# Patient Record
Sex: FEMALE | Race: WHITE | NOT HISPANIC OR LATINO | Employment: UNEMPLOYED | URBAN - NONMETROPOLITAN AREA
[De-identification: names, ages, dates, MRNs, and addresses within clinical notes are randomized per-mention and may not be internally consistent; named-entity substitution may affect disease eponyms.]

---

## 2023-12-12 ENCOUNTER — ANCILLARY PROCEDURE (OUTPATIENT)
Dept: RADIOLOGY | Facility: CLINIC | Age: 43
End: 2023-12-12
Payer: COMMERCIAL

## 2023-12-12 ENCOUNTER — OFFICE VISIT (OUTPATIENT)
Dept: PRIMARY CARE | Facility: CLINIC | Age: 43
End: 2023-12-12
Payer: COMMERCIAL

## 2023-12-12 VITALS
OXYGEN SATURATION: 98 % | WEIGHT: 143 LBS | HEART RATE: 98 BPM | BODY MASS INDEX: 24.55 KG/M2 | DIASTOLIC BLOOD PRESSURE: 90 MMHG | SYSTOLIC BLOOD PRESSURE: 140 MMHG | TEMPERATURE: 97.6 F

## 2023-12-12 DIAGNOSIS — K59.01 SLOW TRANSIT CONSTIPATION: ICD-10-CM

## 2023-12-12 DIAGNOSIS — K59.01 SLOW TRANSIT CONSTIPATION: Primary | ICD-10-CM

## 2023-12-12 PROCEDURE — 1036F TOBACCO NON-USER: CPT

## 2023-12-12 PROCEDURE — 76856 US EXAM PELVIC COMPLETE: CPT

## 2023-12-12 PROCEDURE — 76700 US EXAM ABDOM COMPLETE: CPT | Performed by: RADIOLOGY

## 2023-12-12 PROCEDURE — 99214 OFFICE O/P EST MOD 30 MIN: CPT

## 2023-12-12 PROCEDURE — 76700 US EXAM ABDOM COMPLETE: CPT

## 2023-12-12 PROCEDURE — 76830 TRANSVAGINAL US NON-OB: CPT | Performed by: RADIOLOGY

## 2023-12-12 PROCEDURE — 76856 US EXAM PELVIC COMPLETE: CPT | Performed by: RADIOLOGY

## 2023-12-12 RX ORDER — POLYETHYLENE GLYCOL 3350 17 G/17G
17 POWDER, FOR SOLUTION ORAL DAILY
Qty: 30 PACKET | Refills: 3 | Status: SHIPPED | OUTPATIENT
Start: 2023-12-12 | End: 2024-04-10

## 2023-12-12 NOTE — PATIENT INSTRUCTIONS
US abdomen for adhesions  PRN miralax for bowel regulation    Pelvic floor PT  [facility]   [address] [place] [address] [phone]    [facility]  [address] [facility] [id number] [phone]    Thank you for coming in today, if any questions or concerns arise, please call my office.   Gilles Pardo, APRN-CNP

## 2023-12-12 NOTE — PROGRESS NOTES
Subjective   Patient ID: Neda Cox is a 42 y.o. female who presents for Constipation (Neda is here due to having harder stools then normal since breast feeding. Tried Colace for about a week and eating oatmeal and fiber, with minimal help. Did also try home remedies that helped but instantly went back to constipation. ).  Subjective  Neda Cox is a 42 y.o. female who presents for evaluation of constipation. Onset was 3 month ago. Patient has been having occasional loose and pellet-like stools per week. Defecation has been incomplete. Co-Morbid conditions:irritable bowel syndrome and recent childbirth, 4  sections. Symptoms have progressed to a point and plateaued. Current Health Habits: Eating fiber? yes - flax seed, Exercise? no, Adequate hydration? yes - plenty of water, juices. Current over the counter/prescription laxative: bulk (psyllium) and stimulant which has been somewhat effective.    Objective  [unfilled]     Assessment/Plan  Constipation.  Education about constipation causes and treatment discussed.  Follow up in  3 weeks if symptoms do not improve.  GI consult if no better and if US is negative          Vitals:    23 1424   BP: 140/90   Pulse: 98   Temp: 36.4 °C (97.6 °F)   SpO2: 98%       Review of Systems    Objective   Physical Exam    Assessment/Plan   Problem List Items Addressed This Visit    None  Visit Diagnoses       Slow transit constipation    -  Primary    Relevant Medications    polyethylene glycol (Glycolax, Miralax) 17 gram packet    Other Relevant Orders    US abdomen    US pelvis    Referral to Physical Therapy                 Thank you for coming in today, please call my office if you have any concerns or questions.     Gilles VALVERDE, CNP

## 2023-12-13 ENCOUNTER — TELEPHONE (OUTPATIENT)
Dept: PRIMARY CARE | Facility: CLINIC | Age: 43
End: 2023-12-13
Payer: COMMERCIAL

## 2023-12-13 NOTE — TELEPHONE ENCOUNTER
----- Message from ABRAM Trujillo-CNP sent at 12/13/2023  8:17 AM EST -----  Unremarkable US of abdomen

## 2023-12-19 ENCOUNTER — TELEPHONE (OUTPATIENT)
Dept: PRIMARY CARE | Facility: CLINIC | Age: 43
End: 2023-12-19
Payer: COMMERCIAL

## 2023-12-19 NOTE — TELEPHONE ENCOUNTER
----- Message from Gilles Pardo, ABRAM-CNP sent at 12/14/2023  3:52 PM EST -----  Small cyst right ovary, not worrisome, likely resolves without treatment, no sign of adhesion.

## 2024-01-29 ENCOUNTER — APPOINTMENT (OUTPATIENT)
Dept: OBSTETRICS AND GYNECOLOGY | Facility: CLINIC | Age: 44
End: 2024-01-29
Payer: COMMERCIAL

## 2024-02-26 ENCOUNTER — APPOINTMENT (OUTPATIENT)
Dept: OBSTETRICS AND GYNECOLOGY | Facility: CLINIC | Age: 44
End: 2024-02-26
Payer: COMMERCIAL

## 2024-11-01 ENCOUNTER — APPOINTMENT (OUTPATIENT)
Dept: PRIMARY CARE | Facility: CLINIC | Age: 44
End: 2024-11-01
Payer: COMMERCIAL

## 2024-11-02 ENCOUNTER — APPOINTMENT (OUTPATIENT)
Dept: RADIOLOGY | Facility: HOSPITAL | Age: 44
End: 2024-11-02
Payer: COMMERCIAL

## 2024-11-02 ENCOUNTER — HOSPITAL ENCOUNTER (EMERGENCY)
Facility: HOSPITAL | Age: 44
Discharge: HOME | End: 2024-11-03
Attending: EMERGENCY MEDICINE
Payer: COMMERCIAL

## 2024-11-02 DIAGNOSIS — M25.571 ACUTE RIGHT ANKLE PAIN: Primary | ICD-10-CM

## 2024-11-02 LAB
ALBUMIN SERPL BCP-MCNC: 4.2 G/DL (ref 3.4–5)
ALP SERPL-CCNC: 52 U/L (ref 33–110)
ALT SERPL W P-5'-P-CCNC: 10 U/L (ref 7–45)
ANION GAP SERPL CALC-SCNC: 9 MMOL/L (ref 10–20)
AST SERPL W P-5'-P-CCNC: 14 U/L (ref 9–39)
BASOPHILS # BLD AUTO: 0.03 X10*3/UL (ref 0–0.1)
BASOPHILS NFR BLD AUTO: 0.4 %
BILIRUB SERPL-MCNC: 0.3 MG/DL (ref 0–1.2)
BUN SERPL-MCNC: 16 MG/DL (ref 6–23)
CALCIUM SERPL-MCNC: 9.1 MG/DL (ref 8.6–10.3)
CHLORIDE SERPL-SCNC: 101 MMOL/L (ref 98–107)
CO2 SERPL-SCNC: 29 MMOL/L (ref 21–32)
CREAT SERPL-MCNC: 0.82 MG/DL (ref 0.5–1.05)
CRP SERPL-MCNC: 4.04 MG/DL
EGFRCR SERPLBLD CKD-EPI 2021: >90 ML/MIN/1.73M*2
EOSINOPHIL # BLD AUTO: 0.09 X10*3/UL (ref 0–0.7)
EOSINOPHIL NFR BLD AUTO: 1.1 %
ERYTHROCYTE [DISTWIDTH] IN BLOOD BY AUTOMATED COUNT: 13.2 % (ref 11.5–14.5)
ERYTHROCYTE [SEDIMENTATION RATE] IN BLOOD BY WESTERGREN METHOD: 19 MM/H (ref 0–20)
GLUCOSE SERPL-MCNC: 95 MG/DL (ref 74–99)
HCT VFR BLD AUTO: 36.3 % (ref 36–46)
HGB BLD-MCNC: 12.1 G/DL (ref 12–16)
IMM GRANULOCYTES # BLD AUTO: 0.02 X10*3/UL (ref 0–0.7)
IMM GRANULOCYTES NFR BLD AUTO: 0.3 % (ref 0–0.9)
LACTATE SERPL-SCNC: 0.5 MMOL/L (ref 0.4–2)
LYMPHOCYTES # BLD AUTO: 2.18 X10*3/UL (ref 1.2–4.8)
LYMPHOCYTES NFR BLD AUTO: 27.8 %
MCH RBC QN AUTO: 30.6 PG (ref 26–34)
MCHC RBC AUTO-ENTMCNC: 33.3 G/DL (ref 32–36)
MCV RBC AUTO: 92 FL (ref 80–100)
MONOCYTES # BLD AUTO: 0.55 X10*3/UL (ref 0.1–1)
MONOCYTES NFR BLD AUTO: 7 %
NEUTROPHILS # BLD AUTO: 4.96 X10*3/UL (ref 1.2–7.7)
NEUTROPHILS NFR BLD AUTO: 63.4 %
NRBC BLD-RTO: 0 /100 WBCS (ref 0–0)
PLATELET # BLD AUTO: 338 X10*3/UL (ref 150–450)
POTASSIUM SERPL-SCNC: 3.9 MMOL/L (ref 3.5–5.3)
PROT SERPL-MCNC: 8 G/DL (ref 6.4–8.2)
RBC # BLD AUTO: 3.95 X10*6/UL (ref 4–5.2)
SODIUM SERPL-SCNC: 135 MMOL/L (ref 136–145)
WBC # BLD AUTO: 7.8 X10*3/UL (ref 4.4–11.3)

## 2024-11-02 PROCEDURE — 85025 COMPLETE CBC W/AUTO DIFF WBC: CPT

## 2024-11-02 PROCEDURE — 85652 RBC SED RATE AUTOMATED: CPT | Performed by: EMERGENCY MEDICINE

## 2024-11-02 PROCEDURE — 99283 EMERGENCY DEPT VISIT LOW MDM: CPT | Mod: 25

## 2024-11-02 PROCEDURE — 73610 X-RAY EXAM OF ANKLE: CPT | Mod: RIGHT SIDE | Performed by: RADIOLOGY

## 2024-11-02 PROCEDURE — 86140 C-REACTIVE PROTEIN: CPT | Performed by: EMERGENCY MEDICINE

## 2024-11-02 PROCEDURE — 80053 COMPREHEN METABOLIC PANEL: CPT

## 2024-11-02 PROCEDURE — 36415 COLL VENOUS BLD VENIPUNCTURE: CPT

## 2024-11-02 PROCEDURE — 83605 ASSAY OF LACTIC ACID: CPT

## 2024-11-02 PROCEDURE — 73610 X-RAY EXAM OF ANKLE: CPT | Mod: RT

## 2024-11-02 ASSESSMENT — COLUMBIA-SUICIDE SEVERITY RATING SCALE - C-SSRS
6. HAVE YOU EVER DONE ANYTHING, STARTED TO DO ANYTHING, OR PREPARED TO DO ANYTHING TO END YOUR LIFE?: NO
1. IN THE PAST MONTH, HAVE YOU WISHED YOU WERE DEAD OR WISHED YOU COULD GO TO SLEEP AND NOT WAKE UP?: NO
2. HAVE YOU ACTUALLY HAD ANY THOUGHTS OF KILLING YOURSELF?: NO

## 2024-11-02 ASSESSMENT — PAIN SCALES - GENERAL: PAINLEVEL_OUTOF10: 7

## 2024-11-02 ASSESSMENT — PAIN DESCRIPTION - PAIN TYPE: TYPE: ACUTE PAIN

## 2024-11-02 ASSESSMENT — PAIN - FUNCTIONAL ASSESSMENT: PAIN_FUNCTIONAL_ASSESSMENT: 0-10

## 2024-11-02 ASSESSMENT — PAIN DESCRIPTION - LOCATION: LOCATION: ANKLE

## 2024-11-02 ASSESSMENT — PAIN DESCRIPTION - FREQUENCY: FREQUENCY: CONSTANT/CONTINUOUS

## 2024-11-02 ASSESSMENT — PAIN DESCRIPTION - ORIENTATION: ORIENTATION: RIGHT

## 2024-11-03 VITALS
DIASTOLIC BLOOD PRESSURE: 55 MMHG | HEIGHT: 61 IN | SYSTOLIC BLOOD PRESSURE: 106 MMHG | TEMPERATURE: 98.1 F | HEART RATE: 77 BPM | WEIGHT: 139 LBS | OXYGEN SATURATION: 100 % | RESPIRATION RATE: 18 BRPM | BODY MASS INDEX: 26.24 KG/M2

## 2024-11-03 ASSESSMENT — PAIN SCALES - GENERAL: PAINLEVEL_OUTOF10: 2

## 2024-11-14 ENCOUNTER — LAB (OUTPATIENT)
Dept: LAB | Facility: LAB | Age: 44
End: 2024-11-14
Payer: COMMERCIAL

## 2024-11-14 ENCOUNTER — OFFICE VISIT (OUTPATIENT)
Dept: PRIMARY CARE | Facility: CLINIC | Age: 44
End: 2024-11-14
Payer: COMMERCIAL

## 2024-11-14 VITALS
BODY MASS INDEX: 26.02 KG/M2 | TEMPERATURE: 96.9 F | HEART RATE: 73 BPM | WEIGHT: 137.7 LBS | OXYGEN SATURATION: 99 % | SYSTOLIC BLOOD PRESSURE: 118 MMHG | DIASTOLIC BLOOD PRESSURE: 78 MMHG

## 2024-11-14 DIAGNOSIS — J02.9 SORE THROAT: ICD-10-CM

## 2024-11-14 DIAGNOSIS — T80.69XA SERUM SICKNESS, INITIAL ENCOUNTER: ICD-10-CM

## 2024-11-14 DIAGNOSIS — E06.3 HASHIMOTO'S DISEASE: ICD-10-CM

## 2024-11-14 DIAGNOSIS — E06.3 HASHIMOTO'S DISEASE: Primary | ICD-10-CM

## 2024-11-14 LAB
CRP SERPL-MCNC: 0.51 MG/DL
HETEROPH AB SERPLBLD QL IA.RAPID: NEGATIVE

## 2024-11-14 PROCEDURE — 1036F TOBACCO NON-USER: CPT

## 2024-11-14 PROCEDURE — 84481 FREE ASSAY (FT-3): CPT

## 2024-11-14 PROCEDURE — 84443 ASSAY THYROID STIM HORMONE: CPT

## 2024-11-14 PROCEDURE — 86140 C-REACTIVE PROTEIN: CPT

## 2024-11-14 PROCEDURE — 99214 OFFICE O/P EST MOD 30 MIN: CPT

## 2024-11-14 PROCEDURE — 86376 MICROSOMAL ANTIBODY EACH: CPT

## 2024-11-14 PROCEDURE — 84439 ASSAY OF FREE THYROXINE: CPT

## 2024-11-14 PROCEDURE — 86308 HETEROPHILE ANTIBODY SCREEN: CPT

## 2024-11-14 PROCEDURE — 36415 COLL VENOUS BLD VENIPUNCTURE: CPT

## 2024-11-14 RX ORDER — TRIAMCINOLONE ACETONIDE 5 MG/G
CREAM TOPICAL 3 TIMES DAILY
COMMUNITY

## 2024-11-14 RX ORDER — CEPHALEXIN 500 MG/1
500 CAPSULE ORAL 4 TIMES DAILY
COMMUNITY

## 2024-11-14 NOTE — PROGRESS NOTES
Subjective   Patient ID: Neda Cox is a 43 y.o. female who presents for Foot Problem (R foot- believes she has an infection ongoing in her foot- [place] [facility] and was put on cephalexin. She went to ER in [place] on 11/2 and had XR- then she went to a podiatrist on 11/4. She is concerned for rheumatic fever, or some sort of infection. She has a lot of fatigue.//She would also like to have her thyroid checked.).  Patient presents today s/p multiple visits after symptoms of redness, pain in her foot  Originally believed to be infectious, she underwent multiple rounds of penicillin and cephalosporin antibiotics    Now on therapy for paronychia of the toe by podiatrist - Triamcinolone  Podiatrist offered toenail cutting to remove ingrown nail patient declined    Mildly elevated CRP noted on labs from 11/2  She had fatigue, joint pains systemically    Concern for level 3 hypersensitivity to antibiotic, strep throat was not confirmed but she was empirically treated  We will obtain mono spot and repeat labs today  Hesitant to restart abx due to hypersensitivity concern. Also, her foot appears improved, there is pallor but redness and warmth is absent, no abscess present.        Vitals:    11/14/24 0919   BP: 118/78   Pulse: 73   Temp: 36.1 °C (96.9 °F)   SpO2: 99%       Review of Systems    Objective   Physical Exam    Assessment/Plan   Problem List Items Addressed This Visit    None  Visit Diagnoses       Hashimoto's disease    -  Primary    Relevant Orders    TSH    T3, free    T4, free    Thyroid Peroxidase (TPO) Antibody    Serum sickness, initial encounter        Relevant Orders    C-reactive protein    Sore throat        Relevant Orders    Mononucleosis screen                 Thank you for coming in today, please call my office if you have any concerns or questions.     Gilles VALVERDE, CNP

## 2024-11-15 LAB
T3FREE SERPL-MCNC: 2.9 PG/ML (ref 2.3–4.2)
T4 FREE SERPL-MCNC: 0.76 NG/DL (ref 0.61–1.12)
THYROPEROXIDASE AB SERPL-ACNC: 592 IU/ML
TSH SERPL-ACNC: 3.58 MIU/L (ref 0.44–3.98)

## 2024-11-25 ENCOUNTER — TELEPHONE (OUTPATIENT)
Dept: PRIMARY CARE | Facility: CLINIC | Age: 44
End: 2024-11-25
Payer: COMMERCIAL

## 2024-11-25 NOTE — TELEPHONE ENCOUNTER
----- Message from Gilles Pardo sent at 11/22/2024  8:59 AM EST -----  Results were abnormal... TPO positive for Hashimoto's thyroiditis, however tsh and t4 are normal, mono neg

## 2024-11-25 NOTE — TELEPHONE ENCOUNTER
Patient notified of results, she left a message asking if her thyroid needs addressed with medication.

## 2024-11-27 ENCOUNTER — APPOINTMENT (OUTPATIENT)
Facility: CLINIC | Age: 44
End: 2024-11-27
Payer: COMMERCIAL

## 2024-11-27 ENCOUNTER — HOSPITAL ENCOUNTER (OUTPATIENT)
Dept: RADIOLOGY | Facility: HOSPITAL | Age: 44
Discharge: HOME | End: 2024-11-27
Payer: COMMERCIAL

## 2024-11-27 ENCOUNTER — HOSPITAL ENCOUNTER (OUTPATIENT)
Dept: CARDIOLOGY | Facility: HOSPITAL | Age: 44
Discharge: HOME | End: 2024-11-27
Payer: COMMERCIAL

## 2024-11-27 VITALS
SYSTOLIC BLOOD PRESSURE: 102 MMHG | DIASTOLIC BLOOD PRESSURE: 67 MMHG | WEIGHT: 142 LBS | BODY MASS INDEX: 26.83 KG/M2 | HEART RATE: 83 BPM | OXYGEN SATURATION: 100 %

## 2024-11-27 DIAGNOSIS — M25.50 POLYARTHRALGIA: ICD-10-CM

## 2024-11-27 DIAGNOSIS — J06.9 RECENT URI: ICD-10-CM

## 2024-11-27 DIAGNOSIS — M25.50 POLYARTHRALGIA: Primary | ICD-10-CM

## 2024-11-27 DIAGNOSIS — R00.2 PALPITATIONS: ICD-10-CM

## 2024-11-27 LAB
ATRIAL RATE: 80 BPM
P AXIS: 70 DEGREES
P OFFSET: 199 MS
P ONSET: 137 MS
PR INTERVAL: 170 MS
Q ONSET: 222 MS
QRS COUNT: 13 BEATS
QRS DURATION: 88 MS
QT INTERVAL: 364 MS
QTC CALCULATION(BAZETT): 419 MS
QTC FREDERICIA: 401 MS
R AXIS: 57 DEGREES
T AXIS: 47 DEGREES
T OFFSET: 404 MS
VENTRICULAR RATE: 80 BPM

## 2024-11-27 PROCEDURE — 71046 X-RAY EXAM CHEST 2 VIEWS: CPT | Performed by: RADIOLOGY

## 2024-11-27 PROCEDURE — 93005 ELECTROCARDIOGRAM TRACING: CPT

## 2024-11-27 PROCEDURE — 72202 X-RAY EXAM SI JOINTS 3/> VWS: CPT | Performed by: RADIOLOGY

## 2024-11-27 PROCEDURE — 73562 X-RAY EXAM OF KNEE 3: CPT | Mod: 50

## 2024-11-27 PROCEDURE — 99205 OFFICE O/P NEW HI 60 MIN: CPT | Performed by: INTERNAL MEDICINE

## 2024-11-27 PROCEDURE — 71046 X-RAY EXAM CHEST 2 VIEWS: CPT

## 2024-11-27 PROCEDURE — 73522 X-RAY EXAM HIPS BI 3-4 VIEWS: CPT

## 2024-11-27 PROCEDURE — 72202 X-RAY EXAM SI JOINTS 3/> VWS: CPT

## 2024-11-27 PROCEDURE — 73522 X-RAY EXAM HIPS BI 3-4 VIEWS: CPT | Mod: BILATERAL PROCEDURE | Performed by: RADIOLOGY

## 2024-11-27 PROCEDURE — 73562 X-RAY EXAM OF KNEE 3: CPT | Mod: BILATERAL PROCEDURE | Performed by: RADIOLOGY

## 2024-11-27 PROCEDURE — 73130 X-RAY EXAM OF HAND: CPT | Mod: 50

## 2024-11-27 PROCEDURE — 1036F TOBACCO NON-USER: CPT | Performed by: INTERNAL MEDICINE

## 2024-11-27 PROCEDURE — 73130 X-RAY EXAM OF HAND: CPT | Mod: BILATERAL PROCEDURE | Performed by: RADIOLOGY

## 2024-11-27 ASSESSMENT — ENCOUNTER SYMPTOMS
LOSS OF SENSATION IN FEET: 0
DEPRESSION: 0
OCCASIONAL FEELINGS OF UNSTEADINESS: 0

## 2024-11-27 ASSESSMENT — PATIENT HEALTH QUESTIONNAIRE - PHQ9
2. FEELING DOWN, DEPRESSED OR HOPELESS: NOT AT ALL
SUM OF ALL RESPONSES TO PHQ9 QUESTIONS 1 AND 2: 0
1. LITTLE INTEREST OR PLEASURE IN DOING THINGS: NOT AT ALL

## 2024-11-27 NOTE — PROGRESS NOTES
Subjective   Patient ID: [id number]   Neda Cox is a 43 y.o. female who presents for New Patient Visit.    HPI September had right great toe redness, itching, and discomfort and was using OTC abx   End of September she had generalized, aches, fevers, chills, white patches over tonsils and palpitations. She was given augmentin and 2 pills of prednisone unsure of dose and improved.   October 21st she woke up with arthralgias, hips, knees that she felt when she was going down the stairs, then shoulders, ankle, wrists when she was supporting herself with her hands to go down the stairs and dressing and grooming.   Then towards the end of that week had a blister on the medial aspect of the R great toe.   Then she had a scratchy throat and fevers approximately October 23rd and thought she improved and then October 29th and she went for a walk and had swelling in her ankle.    She had an abscess in her great toe October 31st and was given cephalexin.     11/2 she went to the ER for concern of R ankle swelling and she was concerned that the cellulitis in her great toe was spreading. On exam Lamination of the right ankle and foot revealed minimal erythema over the lateral malleolus. It is not indurated. She did not have leukocytosis, thrombocytosis or left shift. ESR was normal CRP was 4mg/dL  X-ray of the right ankle  FINDINGS:  No evidence for acute fracture or dislocation. No bone lesions or  cortical erosions. The ankle mortise is intact. No radiopaque foreign  body.  IMPRESSION:  No evidence for acute fracture, dislocation or radiopaque foreign  body.    She fell a couple of steps because she thought her ankle gave out on her and slipped the day she saw podiatry.   She saw podiatry and thought she had incurvated toenail and/or dermatitis and prescribed the patient topical steroid with significant improvement and ASO ankle brace which helps.     She reports fatigue, pain in b/l knees, hips, soles across MTPs,  ankles R>L  Knees and hips are most bothersome.   Pain is present and constant in knees and hips and ankle but there are episodes of worsening. Pain is worse with activity and improves with rest.     Shoulders and wrists have significantly improved 90% but is having pain at the site of R A1 pulley.   She is able to lift her kids again    R ankle was swollen but no erythema. Reports swelling in her knees.   She is having significant stiffness but cannot quanitfy    ROS  Constitutional: Denies fever, chills, weight loss, night sweats. Reports low grade fever on October 31st 99F  Eyes: Denies dry eyes. Reports blurry vision for a couple of months (in office vision test was fine). No redness or pain or photophobia  ENT: + dry mouth. No dental loss, loss of taste, nasal or oral ulcers, difficulty swallowing, recurrent sinus infections   Cardiovascular: Denies chest pain. + intermittent palpitations  Respiratory: + shortness of breath on exertion. No cough, asthma, or recurrent respiratory infections  Gastrointestinal: Denies nausea, vomiting, heartburn, abdominal pain, + chronic constipation. No diarrhea, melena or hematochezia  Genitourinary: No recurrent urinary infections or STDs, no genital or anal ulcers.  Integumentary: Denies photosensitivity, rash or lesions, Raynaud's phenomenon, psoriatic lesions, or alopecia  Neurological: + feet sting. No muscle weakness, or incontinence   Hematologic/Lymphatic: Denies history of clots (arterial or venous), or abortions/miscarriages  MSK: No joint pains, redness, hotness or swelling. No inflammatory back pain, enthesitis, dactylitis. No morning stiffness   Muscular: Denies weakness, difficulty rising from chair or combing the hair, muscle aches, or problems with hand      PMH/PSH: Hashimoto's, asthma, she tore her quadriceps during [place]? [place], no imaging or records  Social: Nonsmoker, no alcohol, no drug use. 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 Has 4  children  FHx: No family history of autoimmune diseases       There is no problem list on file for this patient.       Past Medical History:   Diagnosis Date    Encounter for contraceptive management, unspecified 2019    Contraception management    Encounter for supervision of normal pregnancy, unspecified, third trimester 2019    Encounter for pregnancy related examination in third trimester    Irregular menstruation, unspecified 2014    Missed period    Personal history of other diseases of the respiratory system 2016    History of asthma    Personal history of other endocrine, nutritional and metabolic disease 2016    History of hypothyroidism    Personal history of other endocrine, nutritional and metabolic disease 2015    History of Hashimoto thyroiditis    Personal history of other infectious and parasitic diseases     History of chickenpox    Personal history of other specified conditions 2015    History of mastitis    Personal history of other specified conditions 2016    History of mastitis    Unspecified lump in unspecified breast 2019    Breast lump in female    Vitamin D deficiency, unspecified     Vitamin D insufficiency        Past Surgical History:   Procedure Laterality Date     SECTION, LOW TRANSVERSE  2017     Section Low Transverse    OTHER SURGICAL HISTORY  2014    Oral Surgery        Social History     Socioeconomic History    Marital status:      Spouse name: Not on file    Number of children: Not on file    Years of education: Not on file    Highest education level: Not on file   Occupational History    Not on file   Tobacco Use    Smoking status: Never    Smokeless tobacco: Never   Vaping Use    Vaping status: Never Used   Substance and Sexual Activity    Alcohol use: Never    Drug use: Yes    Sexual activity: Not on file   Other Topics Concern    Not on file   Social History Narrative    Not on file      Social Drivers of Health     Financial Resource Strain: Not on file   Food Insecurity: Not on file   Transportation Needs: Not on file   Physical Activity: Not on file   Stress: Not on file   Social Connections: Not on file   Intimate Partner Violence: Not on file   Housing Stability: Not on file        Allergies   Allergen Reactions    Morphine Nausea Only and Nausea/vomiting          Current Outpatient Medications:     triamcinolone (Kenalog) 0.5 % cream, Apply topically 3 times a day., Disp: , Rfl:        Objective     Visit Vitals  /67   Pulse 83      Physical Exam  General: AAOx3, Cooperative  Head: normocephalic, atraumatic  Eyes: EOMI, conjunctiva clear, sclera white, anicteric  Ears: no redness, swelling, tenderness  Nose: no deformity, no crusting   Throat/Mouth: No oral deformities, no cheek swelling, mucosa appear moist, no oral ulcers noted  Neck/Lymph: FROM, trachea midline  Skin: Residual erythema around R great toe.   MSK: Upper Extremities:  Hand/Fingers: No erythema, swelling, tenderness or warmth at DIP, PIP, or MCP joints, FROM grossly. Good hand . No nodules. No deformities   Wrists: No erythema, swelling, warmth or tenderness at wrist, FROM grossly  Elbows: No tenderness, swelling, erythema or warmth at elbows, FROM grossly. No nodules   Shoulders: No swelling, erythema, tenderness or warmth at shoulders. FROM  Lower Extremities:   Hips: No obvious deformities. No joint tenderness, normal ROM grossly. Log roll test negative bilaterally. Asya test reproduces pain on R. No trochanteric bursae TTP  Knees: No tenderness, deformities, rashes, normal ROM grossly. No crepitus, no pes anserine bursa TTP   Ankles: R achilles TTP and swelling. No deformities, erythema, ulceration, or warmth at the ankle. Pain on dorsi and plantar flexion.   Feet: Negative MTP squeeze. Normal ROM grossly.   Spine: No spinal tenderness to palpation. No SI joint tenderness.      Lab Results   Component Value  "Date    WBC 7.8 11/02/2024    HGB 12.1 11/02/2024    HCT 36.3 11/02/2024    MCV 92 11/02/2024     11/02/2024        Chemistry    Lab Results   Component Value Date/Time     (L) 11/02/2024 2022    K 3.9 11/02/2024 2022     11/02/2024 2022    CO2 29 11/02/2024 2022    BUN 16 11/02/2024 2022    CREATININE 0.82 11/02/2024 2022    Lab Results   Component Value Date/Time    CALCIUM 9.1 11/02/2024 2022    ALKPHOS 52 11/02/2024 2022    AST 14 11/02/2024 2022    ALT 10 11/02/2024 2022    BILITOT 0.3 11/02/2024 2022           Lab Results   Component Value Date    CRP 0.51 11/14/2024      Lab Results   Component Value Date    SEDRATE 19 11/02/2024      No results found for: \"CKTOTAL\"  Lab Results   Component Value Date    NEUTROABS 4.96 11/02/2024      No results found for: \"FERRITIN\"   Lab Results   Component Value Date    HEPCAB NONREACTIVE 11/12/2021      Lab Results   Component Value Date    ALT 10 11/02/2024    AST 14 11/02/2024    ALKPHOS 52 11/02/2024    BILITOT 0.3 11/02/2024      No results found for: \"PPD\"   Lab Results   Component Value Date    URICACID 3.8 05/12/2022      Lab Results   Component Value Date    CALCIUM 9.1 11/02/2024      No results found for: \"SPEP\", \"UPEP\"   No results found for: \"ALBUR\", \"KNM21HNO\"     XR ankle right 3+ views  Narrative: Interpreted By:  Shelbie Fatima,   STUDY:  XR ANKLE RIGHT 3+ VIEWS; ;  11/2/2024 8:03 pm      INDICATION:  Signs/Symptoms:right ankle pain, redness, swelling.          COMPARISON:  None.      ACCESSION NUMBER(S):  [id number]      ORDERING CLINICIAN:  CLYDE SAUNDERS      FINDINGS:  No evidence for acute fracture or dislocation. No bone lesions or  cortical erosions. The ankle mortise is intact. No radiopaque foreign  body.      Impression: No evidence for acute fracture, dislocation or radiopaque foreign  body.          MACRO:  None      Signed by: Shelbie Fatima 11/2/2024 9:09 PM  Dictation workstation:   [id number]     === 11/02/24 ===    XR " ANKLE 3+ VIEWS RIGHT    - Impression -  No evidence for acute fracture, dislocation or radiopaque foreign  body.      MACRO:  None    Signed by: Shelbie Fatima 11/2/2024 9:09 PM  Dictation workstation:   [id number]           Assessment/Plan    A 43 year old F with known history of Hashimoto's and a +TPO  here for evaluation of 5 week duration of polyarthralgias associated with AM stiffness following URTI presumed to be GAS vs viral illness.     Arthralgias in wrist and shoulders have self-resolved but still with persistent ankle, knee and hip arthralgias.   She has evidence of Achilles tendinitis on exam today. No subcutaneous nodules.     No rashes.     Mono screen neg  CRP has improved from 4 to 0.51    Impression: Viral arthritis.  Less likely AOSD as no characteristic features vs. Other inflammatory arthritis    - Labs today  - Start NSAIDs scheduled.   - XR today. If unrevealing will likely need advanced imaging to evaluate for synovitis or tenosynovitis or knee/ankle.   - Patient is reporting palpitations and history of pleuritic chest discomfort that resolved. Will obtain CXR and EKG/TTE to evaluate for pericarditis.     RTC in 1 month for discussion of results     Farrukh Hermosillo MD  Division of Rheumatology   [facility]

## 2024-11-29 ENCOUNTER — APPOINTMENT (OUTPATIENT)
Dept: CARDIOLOGY | Facility: HOSPITAL | Age: 44
End: 2024-11-29
Payer: COMMERCIAL

## 2024-11-29 ENCOUNTER — TELEPHONE (OUTPATIENT)
Facility: CLINIC | Age: 44
End: 2024-11-29
Payer: COMMERCIAL

## 2024-11-29 NOTE — TELEPHONE ENCOUNTER
I tried calling the patient to let her know that dr wants her to get an MRI and that she has labs to get done before her next appt. Pt's inbox was full so I was unable to leave a message.   I have personally performed a face to face diagnostic evaluation on this patient. I have reviewed the ACP note and agree with the history, exam and plan of care, except as noted.

## 2024-12-03 ENCOUNTER — APPOINTMENT (OUTPATIENT)
Dept: CARDIOLOGY | Facility: HOSPITAL | Age: 44
End: 2024-12-03
Payer: COMMERCIAL

## 2024-12-03 ENCOUNTER — TELEPHONE (OUTPATIENT)
Dept: RHEUMATOLOGY | Facility: CLINIC | Age: 44
End: 2024-12-03
Payer: COMMERCIAL

## 2024-12-03 NOTE — TELEPHONE ENCOUNTER
I tried calling the patient a second time to let her know the doctor would like to do an MRI of her hand and to let her know of the lab orders the doctor put in. There was no answer so I left a message.

## 2024-12-04 ENCOUNTER — TELEPHONE (OUTPATIENT)
Dept: RHEUMATOLOGY | Facility: CLINIC | Age: 44
End: 2024-12-04

## 2024-12-06 ENCOUNTER — LAB (OUTPATIENT)
Dept: LAB | Facility: LAB | Age: 44
End: 2024-12-06
Payer: COMMERCIAL

## 2024-12-06 ENCOUNTER — HOSPITAL ENCOUNTER (OUTPATIENT)
Dept: CARDIOLOGY | Facility: HOSPITAL | Age: 44
Discharge: HOME | End: 2024-12-06
Payer: COMMERCIAL

## 2024-12-06 DIAGNOSIS — M25.50 POLYARTHRALGIA: ICD-10-CM

## 2024-12-06 DIAGNOSIS — J06.9 RECENT URI: ICD-10-CM

## 2024-12-06 DIAGNOSIS — R00.2 PALPITATIONS: ICD-10-CM

## 2024-12-06 LAB — FERRITIN SERPL-MCNC: 27 NG/ML (ref 8–150)

## 2024-12-06 PROCEDURE — 86200 CCP ANTIBODY: CPT

## 2024-12-06 PROCEDURE — 86038 ANTINUCLEAR ANTIBODIES: CPT

## 2024-12-06 PROCEDURE — 82728 ASSAY OF FERRITIN: CPT

## 2024-12-06 PROCEDURE — 80074 ACUTE HEPATITIS PANEL: CPT

## 2024-12-06 PROCEDURE — 81381 HLA I TYPING 1 ALLELE HR: CPT

## 2024-12-06 PROCEDURE — 86481 TB AG RESPONSE T-CELL SUSP: CPT

## 2024-12-06 PROCEDURE — 86431 RHEUMATOID FACTOR QUANT: CPT

## 2024-12-06 PROCEDURE — 36415 COLL VENOUS BLD VENIPUNCTURE: CPT

## 2024-12-06 PROCEDURE — 93306 TTE W/DOPPLER COMPLETE: CPT

## 2024-12-06 PROCEDURE — 86225 DNA ANTIBODY NATIVE: CPT

## 2024-12-06 PROCEDURE — 86235 NUCLEAR ANTIGEN ANTIBODY: CPT

## 2024-12-06 PROCEDURE — 86060 ANTISTREPTOLYSIN O TITER: CPT

## 2024-12-06 PROCEDURE — 87389 HIV-1 AG W/HIV-1&-2 AB AG IA: CPT

## 2024-12-06 PROCEDURE — 86747 PARVOVIRUS ANTIBODY: CPT

## 2024-12-07 LAB
ASO AB SERPL-ACNC: 1480 IU/ML (ref 0–250)
CCP IGG SERPL-ACNC: <1 U/ML
CENTROMERE B AB SER-ACNC: <0.2 AI
CHROMATIN AB SERPL-ACNC: <0.2 AI
DSDNA AB SER-ACNC: <1 IU/ML
ENA JO1 AB SER QL IA: <0.2 AI
ENA RNP AB SER IA-ACNC: 1.3 AI
ENA SCL70 AB SER QL IA: <0.2 AI
ENA SM AB SER IA-ACNC: <0.2 AI
ENA SM+RNP AB SER QL IA: <0.2 AI
ENA SS-A AB SER IA-ACNC: <0.2 AI
ENA SS-B AB SER IA-ACNC: <0.2 AI
HAV IGM SER QL: NONREACTIVE
HBV CORE IGM SER QL: NONREACTIVE
HBV SURFACE AG SERPL QL IA: NONREACTIVE
HCV AB SER QL: NONREACTIVE
HIV 1+2 AB+HIV1 P24 AG SERPL QL IA: NONREACTIVE
RHEUMATOID FACT SER NEPH-ACNC: <10 IU/ML (ref 0–15)
RIBOSOMAL P AB SER-ACNC: <0.2 AI

## 2024-12-09 LAB
ANA PATTERN: ABNORMAL
ANA SER QL HEP2 SUBST: POSITIVE
ANA TITR SER IF: ABNORMAL {TITER}
AORTIC VALVE PEAK VELOCITY: 1.59 M/S
AV PEAK GRADIENT: 10 MMHG
AVA (PEAK VEL): 2.16 CM2
EJECTION FRACTION APICAL 4 CHAMBER: 69.1
EJECTION FRACTION: 63 %
LEFT ATRIUM VOLUME AREA LENGTH INDEX BSA: 23.8 ML/M2
LEFT VENTRICLE INTERNAL DIMENSION DIASTOLE: 4.02 CM (ref 3.5–6)
LEFT VENTRICULAR OUTFLOW TRACT DIAMETER: 1.9 CM
MITRAL VALVE E/A RATIO: 1.52
NIL(NEG) CONTROL SPOT COUNT: NORMAL
PANEL A SPOT COUNT: 0
PANEL B SPOT COUNT: 1
POS CONTROL SPOT COUNT: NORMAL
RIGHT VENTRICLE FREE WALL PEAK S': 14 CM/S
RIGHT VENTRICLE PEAK SYSTOLIC PRESSURE: 21.1 MMHG
T-SPOT. TB INTERPRETATION: NEGATIVE
TRICUSPID ANNULAR PLANE SYSTOLIC EXCURSION: 2.4 CM

## 2024-12-10 LAB
B19V IGG SER IA-ACNC: 6.63 IV
B19V IGM SER IA-ACNC: 0.17 IV
HLAB27 TYPING: NEGATIVE

## 2025-01-08 ENCOUNTER — APPOINTMENT (OUTPATIENT)
Facility: CLINIC | Age: 45
End: 2025-01-08
Payer: COMMERCIAL

## 2025-01-08 VITALS
HEART RATE: 76 BPM | DIASTOLIC BLOOD PRESSURE: 64 MMHG | OXYGEN SATURATION: 99 % | BODY MASS INDEX: 26.43 KG/M2 | SYSTOLIC BLOOD PRESSURE: 98 MMHG | HEIGHT: 61 IN | WEIGHT: 140 LBS

## 2025-01-08 DIAGNOSIS — M25.50 POLYARTHRALGIA: ICD-10-CM

## 2025-01-08 DIAGNOSIS — J06.9 RECENT URI: Primary | ICD-10-CM

## 2025-01-08 PROCEDURE — 1036F TOBACCO NON-USER: CPT | Performed by: INTERNAL MEDICINE

## 2025-01-08 PROCEDURE — 3008F BODY MASS INDEX DOCD: CPT | Performed by: INTERNAL MEDICINE

## 2025-01-08 PROCEDURE — 99215 OFFICE O/P EST HI 40 MIN: CPT | Performed by: INTERNAL MEDICINE

## 2025-01-08 RX ORDER — IBUPROFEN 600 MG/1
600 TABLET ORAL 2 TIMES DAILY
COMMUNITY

## 2025-01-08 ASSESSMENT — ENCOUNTER SYMPTOMS
DEPRESSION: 0
LOSS OF SENSATION IN FEET: 0
OCCASIONAL FEELINGS OF UNSTEADINESS: 0

## 2025-01-08 ASSESSMENT — PATIENT HEALTH QUESTIONNAIRE - PHQ9
2. FEELING DOWN, DEPRESSED OR HOPELESS: NOT AT ALL
1. LITTLE INTEREST OR PLEASURE IN DOING THINGS: NOT AT ALL
SUM OF ALL RESPONSES TO PHQ9 QUESTIONS 1 AND 2: 0

## 2025-01-08 ASSESSMENT — PAIN SCALES - GENERAL: PAINLEVEL_OUTOF10: 2

## 2025-01-08 NOTE — PROGRESS NOTES
Subjective   Patient ID: [id number]  Neda Cox is a 44 y.o. female who presents for Follow-up.  HPI  PMH/PSH: Hashimoto's, asthma, she tore her quadriceps during cycling class? In [place], no imaging or records  Social: Nonsmoker, no alcohol, no drug use. 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 Has 4 children  FHx: No family history of autoimmune diseases     Recall:  September 2024 had right great toe redness, itching, and discomfort and was using OTC abx   End of September she had generalized, aches, fevers, chills, white patches over tonsils and palpitations. She was given augmentin and 2 pills of prednisone unsure of dose and improved.   October 21st she woke up with arthralgias, hips, knees that she felt when she was going down the stairs, then shoulders, ankle, wrists when she was supporting herself with her hands to go down the stairs and dressing and grooming.   Then towards the end of that week had a blister on the medial aspect of the R great toe.   Then she had a scratchy throat and fevers approximately October 23rd and thought she improved and then October 29th and she went for a walk and had swelling in her ankle.    She had an abscess in her great toe October 31st and was given cephalexin.      11/2 she went to the ER for concern of R ankle swelling and she was concerned that the cellulitis in her great toe was spreading. On exam of the right ankle and foot revealed minimal erythema over the lateral malleolus. It is not indurated. She did not have leukocytosis, thrombocytosis or left shift. ESR was normal CRP was 4mg/dL  X-ray of the right ankle  FINDINGS:  No evidence for acute fracture or dislocation. No bone lesions or  cortical erosions. The ankle mortise is intact. No radiopaque foreign  body.  IMPRESSION:  No evidence for acute fracture, dislocation or radiopaque foreign  body.     She fell a couple of steps because her ankle gave out on her and slipped the day she saw podiatry.   She saw podiatry  "who thought she had incurvated toenail and/or dermatitis and prescribed the patient topical steroid with significant improvement and ASO ankle brace which helps.      First visit 11/27/2024:  She reports fatigue, pain in b/l knees, hips, soles across MTPs, ankles R>L  Knees and hips are most bothersome.   Pain is present and constant in knees and hips and ankle but there are episodes of worsening. Pain is worse with activity and improves with rest.   Shoulders and wrists have significantly improved 90% but is having pain at the site of R A1 pulley.   She is able to lift her kids again  R ankle was swollen but no erythema. Reports swelling in her knees.   She is having significant stiffness but cannot quanitfy    At last visit, we started advil 600mg BID and she noticed a big difference since December 17. \"Night and day\" \"I feel 100% back to normal for the most part\" Reports ROM has significantly improved. A couple of times she missed a dose of ibuprofen and pain would recur in her knees b/l. No pain in her R ankle.   Last low grade fever low grade fever on October 31st 99F    No fever, chills, weight loss, night sweats. Denies dry eyes. Reports blurry vision for a couple of months (in office vision test was fine). No redness or pain or photophobia. + dry mouth. No dental loss, loss of taste, nasal or oral ulcers, difficulty swallowing, recurrent sinus infections. + intermittent palpitations.+ shortness of breath on exertion. No cough, asthma, or recurrent respiratory infections. Denies nausea, vomiting, heartburn, abdominal pain, + chronic constipation is improving. No diarrhea, melena or hematochezia. No recurrent urinary infections or STDs, no genital or anal ulcers. Denies photosensitivity, rash or lesions, Raynaud's phenomenon, psoriatic lesions, or alopecia. Denies history of clots (arterial or venous), or abortions/miscarriages. No joint pains, redness, hotness or swelling. No inflammatory back pain, " enthesitis, dactylitis. No morning stiffness. Denies weakness, difficulty rising from chair or combing the hair, muscle aches, or problems with hand        There is no problem list on file for this patient.      Past Medical History:   Diagnosis Date    Encounter for contraceptive management, unspecified 2019    Contraception management    Encounter for supervision of normal pregnancy, unspecified, third trimester 2019    Encounter for pregnancy related examination in third trimester    Irregular menstruation, unspecified 2014    Missed period    Personal history of other diseases of the respiratory system 2016    History of asthma    Personal history of other endocrine, nutritional and metabolic disease 2016    History of hypothyroidism    Personal history of other endocrine, nutritional and metabolic disease 2015    History of Hashimoto thyroiditis    Personal history of other infectious and parasitic diseases     History of chickenpox    Personal history of other specified conditions 2015    History of mastitis    Personal history of other specified conditions 2016    History of mastitis    Unspecified lump in unspecified breast 2019    Breast lump in female    Vitamin D deficiency, unspecified     Vitamin D insufficiency       Past Surgical History:   Procedure Laterality Date     SECTION, LOW TRANSVERSE  2017     Section Low Transverse    OTHER SURGICAL HISTORY  2014    Oral Surgery       Social History     Socioeconomic History    Marital status:      Spouse name: Not on file    Number of children: Not on file    Years of education: Not on file    Highest education level: Not on file   Occupational History    Not on file   Tobacco Use    Smoking status: Never    Smokeless tobacco: Never   Vaping Use    Vaping status: Never Used   Substance and Sexual Activity    Alcohol use: Never    Drug use: Yes    Sexual activity:  Not on file   Other Topics Concern    Not on file   Social History Narrative    Not on file     Social Drivers of Health     Financial Resource Strain: Not on file   Food Insecurity: Not on file   Transportation Needs: Not on file   Physical Activity: Not on file   Stress: Not on file   Social Connections: Not on file   Intimate Partner Violence: Not on file   Housing Stability: Not on file       Allergies   Allergen Reactions    Morphine Nausea Only and Nausea/vomiting         Current Outpatient Medications:     ibuprofen 600 mg tablet, Take 1 tablet (600 mg) by mouth 2 times a day., Disp: , Rfl:     triamcinolone (Kenalog) 0.5 % cream, Apply topically 3 times a day. (Patient not taking: Reported on 1/8/2025), Disp: , Rfl:     Objective     Visit Vitals  BP 98/64   Pulse 76       Physical Exam  Copied from previous unless annotated below  General: AAOx3, Cooperative  Head: normocephalic, atraumatic  Eyes: EOMI, conjunctiva clear, sclera white, anicteric  Ears: no redness, swelling, tenderness  Nose: no deformity, no crusting   Throat/Mouth: No oral deformities, no cheek swelling, mucosa appear moist, no oral ulcers noted  Neck/Lymph: FROM, trachea midline  Skin: Residual erythema around R great toe.   MSK: Upper Extremities:  Hand/Fingers: No erythema, swelling, tenderness or warmth at DIP, PIP, or MCP joints, FROM grossly. Good hand . No nodules. No deformities   Wrists: No erythema, swelling, warmth or tenderness at wrist, FROM grossly  Elbows: No tenderness, swelling, erythema or warmth at elbows, FROM grossly. No nodules   Shoulders: No swelling, erythema, tenderness or warmth at shoulders. FROM  Lower Extremities:   Hips: No obvious deformities. No joint tenderness, normal ROM grossly. Log roll test negative bilaterally. Asya test reproduces pain on R. No trochanteric bursae TTP  Knees: No tenderness, deformities, rashes, normal ROM grossly. No crepitus, no pes anserine bursa TTP   Ankles: R achilles TTP  and swelling is no longer present. No deformities, erythema, ulceration, or warmth at the ankle. Pain on dorsi and plantar flexion.   Feet: Negative MTP squeeze. Normal ROM grossly.   Spine: No spinal tenderness to palpation. No SI joint tenderness.      Lab Results   Component Value Date    WBC 7.8 11/02/2024    HGB 12.1 11/02/2024    HCT 36.3 11/02/2024    MCV 92 11/02/2024     11/02/2024       Chemistry    Lab Results   Component Value Date/Time     (L) 11/02/2024 2022    K 3.9 11/02/2024 2022     11/02/2024 2022    CO2 29 11/02/2024 2022    BUN 16 11/02/2024 2022    CREATININE 0.82 11/02/2024 2022    Lab Results   Component Value Date/Time    CALCIUM 9.1 11/02/2024 2022    ALKPHOS 52 11/02/2024 2022    AST 14 11/02/2024 2022    ALT 10 11/02/2024 2022    BILITOT 0.3 11/02/2024 2022          Lab Results   Component Value Date    CRP 0.51 11/14/2024    MAMI Positive (A) 12/06/2024    JO1 <0.2 12/06/2024    CALCIUM 9.1 11/02/2024    FERRITIN 27 12/06/2024     Alkaline Phosphatase   Date Value Ref Range Status   11/02/2024 52 33 - 110 U/L Final     AST   Date Value Ref Range Status   11/02/2024 14 9 - 39 U/L Final     Urea Nitrogen   Date Value Ref Range Status   11/02/2024 16 6 - 23 mg/dL Final     Sodium   Date Value Ref Range Status   11/02/2024 135 (L) 136 - 145 mmol/L Final     Potassium   Date Value Ref Range Status   11/02/2024 3.9 3.5 - 5.3 mmol/L Final     Bicarbonate   Date Value Ref Range Status   11/02/2024 29 21 - 32 mmol/L Final     ALT   Date Value Ref Range Status   11/02/2024 10 7 - 45 U/L Final     Comment:     Patients treated with Sulfasalazine may generate falsely decreased results for ALT.       ECG 12 lead  Normal sinus rhythm  Normal ECG  No previous ECGs available  Confirmed by Valentin Lamas ([id number]) on 12/13/2024 5:45:08 PM    XR SI 11/27/2024  FINDINGS:  Bilateral sacroiliac joints are preserved. No evidence of sclerosis  or erosive changes on either side of the  bilateral sacroiliac joints.  No fracture or dislocation. No osseous lesion.      IMPRESSION  Normal radiographic examination of the sacroiliac joints.    CXR 11/27/2024  CARDIOMEDIASTINAL SILHOUETTE:  Cardiomediastinal silhouette is normal in size and configuration.      LUNGS:  Lungs are clear.      ABDOMEN:  No remarkable upper abdominal findings.    XR hands 11/27/2024  FINDINGS:  Joint spaces are bilaterally preserved. No fracture or dislocation.  No osseous lesion. No periarticular erosive changes.      The right 4th distal phalanx demonstrates a geographic area of  sclerosis involving the lateral half of the phalanx. This is of  uncertain etiology.      IMPRESSION  Joint spaces preserved bilaterally.      Aco osteosclerosis of the right 4th distal phalanx. Differential is  broad and is associated with inflammatory arthropathies, trauma,  autoimmune disorders, sclerosing bone dysplasia such as  melorheostosis, and primary bone lesions. Clinical correlation is  advised. MRI can be considered for additional characterization.          MACRO  None    XR hips b/l 11/27/2024  FINDINGS:  Bilateral hip joints are preserved. No productive degenerative  changes or periarticular erosive changes. No fracture or dislocation.  No osseous lesion..      IMPRESSION  Normal bilateral hip joints.    XR knee b/l 11/27/2024  FINDINGS:  Joint spaces are preserved. No effusion bilaterally. No fracture or  dislocation. No osseous lesion. No periarticular erosive changes.      IMPRESSION  Normal bilateral knee radiographs. MRI can be considered for more  sensitive characterization if indicated    XR ankle right 3+ views  Narrative: Interpreted By:  Shelbie Fatima,   STUDY:  XR ANKLE RIGHT 3+ VIEWS; ;  11/2/2024 8:03 pm      INDICATION:  Signs/Symptoms:right ankle pain, redness, swelling.          COMPARISON:  None.      ACCESSION NUMBER(S):  [id number]      ORDERING CLINICIAN:  CLYDE SAUNDERS      FINDINGS:  No evidence for acute  fracture or dislocation. No bone lesions or  cortical erosions. The ankle mortise is intact. No radiopaque foreign  body.      Impression: No evidence for acute fracture, dislocation or radiopaque foreign  body.          MACRO:  None      Signed by: Shelbie Fatima 11/2/2024 9:09 PM  Dictation workstation:   [id number]     === 11/02/24 ===     XR ANKLE 3+ VIEWS RIGHT     - Impression -  No evidence for acute fracture, dislocation or radiopaque foreign  body.        MACRO:  None     Signed by: Shelbie Fatima 11/2/2024 9:09 PM  Dictation workstation:   [id number]       Left Ventricle: Left ventricular ejection fraction is normal, by visual estimate at 60-65%. There are no regional left ventricular wall motion abnormalities. The left ventricular cavity size is normal. There is normal septal and normal posterior left ventricular wall thickness. Spectral Doppler shows a normal pattern of left ventricular diastolic filling.  Left Atrium: The left atrium is normal in size.  Right Ventricle: The right ventricle is normal in size. There is normal right ventricular global systolic function.  Right Atrium: The right atrium is normal in size.  Aortic Valve: The aortic valve is trileaflet. There is no evidence of aortic valve regurgitation. The peak instantaneous gradient of the aortic valve is 10 mmHg.  Mitral Valve: The mitral valve is normal in structure. There is mild mitral valve regurgitation.  Tricuspid Valve: The tricuspid valve is structurally normal. There is mild tricuspid regurgitation.  Pulmonic Valve: The pulmonic valve is not well visualized. There is physiologic pulmonic valve regurgitation.  Pericardium: There is no pericardial effusion noted.  Aorta: The aortic root is normal.        CONCLUSIONS:   1. Left ventricular ejection fraction is normal, by visual estimate at 60-65%.   2. There is normal right ventricular global systolic function.      Assessment/Plan   A 43 year old F with known history of Hashimoto's  and a +TPO  here for follow up.   She was first evaluated 11/2024 for 5 week duration of polyarthralgias associated with AM stiffness following URTI presumed to be GAS vs viral illness.      Since starting NSAIDs her polyarthralgias have completely resolved. No evidence of synovitis today.   At last visit there was evidence of Achilles tendinitis on exam. No subcutaneous nodules. No rashes.      Mono screen neg  CRP has improved from 4 to 0.51   MAMI 1:320, + TPO, low titer + RNP  Parvo IgG+ and ASO+  RF, CCP neg  HLA B27 neg  EKG read NSR  Mild TR on TTE but no pericardial effusion    Today we discussed results of labs and XR in detail.     Impression: Viral arthritis.   vs. Other inflammatory arthritis     - Continue NSAIDs.   - Discussed usual course of viral arthritis.   - Other than polyarthralgias, no stigmata of a SLE/MCTD but will monitor.  - Discussed obtaining MR hands or bone scan to evaluate area of slcerosis involving the lateral half of the R 4th distal phalanx for further characterization. Patient would like to defer till she switches insurance.   - Patient will inform me of any joint swelling or pain or limitation in ROM, discussed possibly obtaining advanced imaging to evaluate for synovitis or tenosynovitis.   - Discussed referral to cardiology for ongoing palpitations, patient would like to defer till she switches insurance.    - Labs one week prior to next visit.      RTC in 3 months      Farrukh Hermosillo MD  Division of Rheumatology   [facility]

## 2025-04-29 DIAGNOSIS — Z12.31 ENCOUNTER FOR SCREENING MAMMOGRAM FOR BREAST CANCER: ICD-10-CM

## 2025-05-16 ENCOUNTER — APPOINTMENT (OUTPATIENT)
Facility: CLINIC | Age: 45
End: 2025-05-16
Payer: COMMERCIAL